# Patient Record
Sex: FEMALE | Race: WHITE | NOT HISPANIC OR LATINO | Employment: UNEMPLOYED | URBAN - METROPOLITAN AREA
[De-identification: names, ages, dates, MRNs, and addresses within clinical notes are randomized per-mention and may not be internally consistent; named-entity substitution may affect disease eponyms.]

---

## 2024-09-05 ENCOUNTER — LAB (OUTPATIENT)
Dept: LAB | Facility: LAB | Age: 29
End: 2024-09-05
Payer: COMMERCIAL

## 2024-09-05 DIAGNOSIS — E56.9 VITAMIN DEFICIENCY: Primary | ICD-10-CM

## 2024-09-05 DIAGNOSIS — R41.89 BRAIN FOG: ICD-10-CM

## 2024-09-05 PROBLEM — M94.0 COSTOCHONDRITIS: Status: ACTIVE | Noted: 2024-09-05

## 2024-09-05 PROBLEM — K59.00 CONSTIPATION: Status: ACTIVE | Noted: 2024-09-05

## 2024-09-05 PROBLEM — F41.9 ANXIETY: Status: ACTIVE | Noted: 2024-09-05

## 2024-09-05 LAB
25(OH)D3 SERPL-MCNC: 22 NG/ML (ref 30–100)
ANION GAP SERPL CALC-SCNC: 9 MMOL/L (ref 10–20)
BUN SERPL-MCNC: 12 MG/DL (ref 6–23)
CALCIUM SERPL-MCNC: 8.5 MG/DL (ref 8.6–10.3)
CHLORIDE SERPL-SCNC: 104 MMOL/L (ref 98–107)
CO2 SERPL-SCNC: 26 MMOL/L (ref 21–32)
CREAT SERPL-MCNC: 0.72 MG/DL (ref 0.5–1.05)
EGFRCR SERPLBLD CKD-EPI 2021: >90 ML/MIN/1.73M*2
ERYTHROCYTE [DISTWIDTH] IN BLOOD BY AUTOMATED COUNT: 12.5 % (ref 11.5–14.5)
GLUCOSE SERPL-MCNC: 91 MG/DL (ref 74–99)
HCT VFR BLD AUTO: 42.8 % (ref 36–46)
HGB BLD-MCNC: 14.1 G/DL (ref 12–16)
IRON SATN MFR SERPL: 31 % (ref 25–45)
IRON SERPL-MCNC: 110 UG/DL (ref 35–150)
MCH RBC QN AUTO: 29.6 PG (ref 26–34)
MCHC RBC AUTO-ENTMCNC: 32.9 G/DL (ref 32–36)
MCV RBC AUTO: 90 FL (ref 80–100)
NRBC BLD-RTO: 0 /100 WBCS (ref 0–0)
PLATELET # BLD AUTO: 220 X10*3/UL (ref 150–450)
POTASSIUM SERPL-SCNC: 4.2 MMOL/L (ref 3.5–5.3)
RBC # BLD AUTO: 4.76 X10*6/UL (ref 4–5.2)
SODIUM SERPL-SCNC: 135 MMOL/L (ref 136–145)
TIBC SERPL-MCNC: 352 UG/DL (ref 240–445)
TSH SERPL-ACNC: 1.53 MIU/L (ref 0.44–3.98)
UIBC SERPL-MCNC: 242 UG/DL (ref 110–370)
VIT B12 SERPL-MCNC: 410 PG/ML (ref 211–911)
WBC # BLD AUTO: 6.5 X10*3/UL (ref 4.4–11.3)

## 2024-09-05 PROCEDURE — 80048 BASIC METABOLIC PNL TOTAL CA: CPT

## 2024-09-05 PROCEDURE — 82607 VITAMIN B-12: CPT

## 2024-09-05 PROCEDURE — 82306 VITAMIN D 25 HYDROXY: CPT

## 2024-09-05 PROCEDURE — 85027 COMPLETE CBC AUTOMATED: CPT

## 2024-09-05 PROCEDURE — 83540 ASSAY OF IRON: CPT

## 2024-09-05 PROCEDURE — 83550 IRON BINDING TEST: CPT

## 2024-09-05 PROCEDURE — 84443 ASSAY THYROID STIM HORMONE: CPT

## 2024-09-05 PROCEDURE — 36415 COLL VENOUS BLD VENIPUNCTURE: CPT

## 2024-09-06 PROBLEM — E56.9 VITAMIN DEFICIENCY: Status: ACTIVE | Noted: 2024-09-06

## 2024-09-06 RX ORDER — CHOLECALCIFEROL (VITAMIN D3) 50 MCG
2000 TABLET ORAL DAILY
Qty: 30 TABLET | Refills: 1 | Status: SHIPPED | OUTPATIENT
Start: 2024-09-06 | End: 2024-11-05

## 2024-09-09 ENCOUNTER — TELEPHONE (OUTPATIENT)
Dept: PRIMARY CARE | Facility: CLINIC | Age: 29
End: 2024-09-09
Payer: COMMERCIAL

## 2024-09-09 NOTE — TELEPHONE ENCOUNTER
Called and spoke to patient regarding results of labs, that vitamin D was low and that 2,000 U daily was recommended and to recheck in 8 weeks.   Verbalized understanding.

## 2024-09-09 NOTE — TELEPHONE ENCOUNTER
----- Message from Lucius Sutherland sent at 9/6/2024  7:24 AM EDT -----  Low vitamin D, would recommend vitamin D 2000 units daily and re-check in 8 weeks

## 2024-12-26 ENCOUNTER — APPOINTMENT (OUTPATIENT)
Dept: PRIMARY CARE | Facility: CLINIC | Age: 29
End: 2024-12-26
Payer: COMMERCIAL

## 2024-12-26 ENCOUNTER — LAB (OUTPATIENT)
Dept: LAB | Facility: LAB | Age: 29
End: 2024-12-26
Payer: COMMERCIAL

## 2024-12-26 VITALS
HEART RATE: 92 BPM | WEIGHT: 138.4 LBS | SYSTOLIC BLOOD PRESSURE: 93 MMHG | DIASTOLIC BLOOD PRESSURE: 59 MMHG | BODY MASS INDEX: 26.58 KG/M2 | TEMPERATURE: 97.2 F | OXYGEN SATURATION: 94 %

## 2024-12-26 DIAGNOSIS — R59.1 LAD (LYMPHADENOPATHY): Primary | ICD-10-CM

## 2024-12-26 DIAGNOSIS — R59.1 LAD (LYMPHADENOPATHY): ICD-10-CM

## 2024-12-26 LAB
BASOPHILS # BLD AUTO: 0.04 X10*3/UL (ref 0–0.1)
BASOPHILS NFR BLD AUTO: 0.6 %
EOSINOPHIL # BLD AUTO: 0.09 X10*3/UL (ref 0–0.7)
EOSINOPHIL NFR BLD AUTO: 1.4 %
ERYTHROCYTE [DISTWIDTH] IN BLOOD BY AUTOMATED COUNT: 12.4 % (ref 11.5–14.5)
HCT VFR BLD AUTO: 43.3 % (ref 36–46)
HGB BLD-MCNC: 14 G/DL (ref 12–16)
IMM GRANULOCYTES # BLD AUTO: 0.03 X10*3/UL (ref 0–0.7)
IMM GRANULOCYTES NFR BLD AUTO: 0.5 % (ref 0–0.9)
LYMPHOCYTES # BLD AUTO: 1.79 X10*3/UL (ref 1.2–4.8)
LYMPHOCYTES NFR BLD AUTO: 27.1 %
MCH RBC QN AUTO: 29.1 PG (ref 26–34)
MCHC RBC AUTO-ENTMCNC: 32.3 G/DL (ref 32–36)
MCV RBC AUTO: 90 FL (ref 80–100)
MONOCYTES # BLD AUTO: 0.53 X10*3/UL (ref 0.1–1)
MONOCYTES NFR BLD AUTO: 8 %
NEUTROPHILS # BLD AUTO: 4.12 X10*3/UL (ref 1.2–7.7)
NEUTROPHILS NFR BLD AUTO: 62.4 %
NRBC BLD-RTO: 0 /100 WBCS (ref 0–0)
PLATELET # BLD AUTO: 264 X10*3/UL (ref 150–450)
RBC # BLD AUTO: 4.81 X10*6/UL (ref 4–5.2)
WBC # BLD AUTO: 6.6 X10*3/UL (ref 4.4–11.3)

## 2024-12-26 PROCEDURE — 99213 OFFICE O/P EST LOW 20 MIN: CPT | Performed by: STUDENT IN AN ORGANIZED HEALTH CARE EDUCATION/TRAINING PROGRAM

## 2024-12-26 PROCEDURE — 1036F TOBACCO NON-USER: CPT | Performed by: STUDENT IN AN ORGANIZED HEALTH CARE EDUCATION/TRAINING PROGRAM

## 2024-12-26 PROCEDURE — 85025 COMPLETE CBC W/AUTO DIFF WBC: CPT

## 2024-12-26 RX ORDER — CHOLECALCIFEROL (VITAMIN D3) 25 MCG
1000 TABLET ORAL DAILY
COMMUNITY

## 2024-12-26 ASSESSMENT — ENCOUNTER SYMPTOMS
LIGHT-HEADEDNESS: 0
CHILLS: 0
FEVER: 0
SHORTNESS OF BREATH: 1
PAIN: 1
HEADACHES: 0
DIZZINESS: 0
COUGH: 0
UNEXPECTED WEIGHT CHANGE: 1
WEAKNESS: 1
FATIGUE: 1

## 2024-12-26 NOTE — PROGRESS NOTES
Assessment/Plan   Problem List Items Addressed This Visit    None  Visit Diagnoses         Codes    LAD (lymphadenopathy)    -  Primary R59.1    Relevant Orders    CBC and Auto Differential    BI US breast complete right    BI mammo right diagnostic tomosynthesis            Subjective   Patient ID: Monica Lipscomb is a 29 y.o. female who presents for Pain (Rt side. Lymph node pain, starts around rib cage to under arm pit sometimes neck lymph is sore to touch, also ovaries feel full./Fog around eyes, overtired feeling after waking, weight gain even though incorp excersise.).  Pain  Associated symptoms include fatigue, shortness of breath (when going up the stairs) and weakness. Pertinent negatives include no chest pain, fever or headaches.     She states that four months ago she noticed pain in R axillary area. Last week she had cervical adenopathy on R side which has since resolved.. She has R She feels tired, has mind fog, pelvic pain and fullness at when she lays down at night.   Review of Systems   Constitutional:  Positive for fatigue and unexpected weight change. Negative for chills and fever.   HENT:  Negative for congestion.    Respiratory:  Positive for shortness of breath (when going up the stairs). Negative for cough.    Cardiovascular:  Negative for chest pain.   Neurological:  Positive for weakness. Negative for dizziness, light-headedness and headaches.       Objective   Physical Exam  Constitutional:       General: She is not in acute distress.     Appearance: Normal appearance. She is not ill-appearing.   HENT:      Head: Normocephalic and atraumatic.   Eyes:      Extraocular Movements: Extraocular movements intact.   Cardiovascular:      Rate and Rhythm: Normal rate and regular rhythm.   Pulmonary:      Effort: Pulmonary effort is normal. No respiratory distress.      Breath sounds: Normal breath sounds. No stridor. No wheezing, rhonchi or rales.   Lymphadenopathy:      Cervical: No cervical  adenopathy.   Neurological:      Mental Status: She is alert.             Lucius Sutherland MD 12/26/24 10:32 AM

## 2024-12-31 ENCOUNTER — HOSPITAL ENCOUNTER (OUTPATIENT)
Dept: RADIOLOGY | Facility: CLINIC | Age: 29
Discharge: HOME | End: 2024-12-31
Payer: COMMERCIAL

## 2024-12-31 VITALS — BODY MASS INDEX: 26.14 KG/M2 | WEIGHT: 138.45 LBS | HEIGHT: 61 IN

## 2024-12-31 DIAGNOSIS — R59.1 LAD (LYMPHADENOPATHY): ICD-10-CM

## 2024-12-31 PROCEDURE — 76642 ULTRASOUND BREAST LIMITED: CPT | Mod: 50

## 2024-12-31 PROCEDURE — 77066 DX MAMMO INCL CAD BI: CPT

## 2024-12-31 PROCEDURE — 77062 BREAST TOMOSYNTHESIS BI: CPT | Performed by: STUDENT IN AN ORGANIZED HEALTH CARE EDUCATION/TRAINING PROGRAM

## 2024-12-31 PROCEDURE — 76983 USE EA ADDL TARGET LESION: CPT | Mod: 50

## 2024-12-31 PROCEDURE — 76642 ULTRASOUND BREAST LIMITED: CPT | Performed by: STUDENT IN AN ORGANIZED HEALTH CARE EDUCATION/TRAINING PROGRAM

## 2024-12-31 PROCEDURE — 77066 DX MAMMO INCL CAD BI: CPT | Performed by: STUDENT IN AN ORGANIZED HEALTH CARE EDUCATION/TRAINING PROGRAM

## 2025-07-29 ENCOUNTER — APPOINTMENT (OUTPATIENT)
Dept: PRIMARY CARE | Facility: CLINIC | Age: 30
End: 2025-07-29
Payer: COMMERCIAL

## 2025-07-29 DIAGNOSIS — H53.8 BLURRED VISION: ICD-10-CM

## 2025-07-29 DIAGNOSIS — R42 VERTIGO: ICD-10-CM

## 2025-07-29 DIAGNOSIS — F41.1 GAD (GENERALIZED ANXIETY DISORDER): Primary | ICD-10-CM

## 2025-07-29 DIAGNOSIS — R51.9 INTERMITTENT HEADACHE: ICD-10-CM

## 2025-07-29 DIAGNOSIS — R53.83 OTHER FATIGUE: ICD-10-CM

## 2025-07-29 PROCEDURE — 1036F TOBACCO NON-USER: CPT | Performed by: STUDENT IN AN ORGANIZED HEALTH CARE EDUCATION/TRAINING PROGRAM

## 2025-07-29 PROCEDURE — 99214 OFFICE O/P EST MOD 30 MIN: CPT | Performed by: STUDENT IN AN ORGANIZED HEALTH CARE EDUCATION/TRAINING PROGRAM

## 2025-07-29 RX ORDER — ESCITALOPRAM OXALATE 5 MG/1
5 TABLET ORAL DAILY
Qty: 30 TABLET | Refills: 2 | Status: SHIPPED | OUTPATIENT
Start: 2025-07-29 | End: 2025-10-27

## 2025-07-29 NOTE — PROGRESS NOTES
Assessment/Plan   Assessment & Plan  Blurred vision    Orders:    MR brain w and wo IV contrast; Future    Basic metabolic panel; Future    CBC; Future    TSH; Future    Tsh With Reflex To Free T4 If Abnormal; Future    Intermittent headache    Orders:    MR brain w and wo IV contrast; Future    Basic metabolic panel; Future    CBC; Future    TSH; Future    Tsh With Reflex To Free T4 If Abnormal; Future    Vertigo    Orders:    MR brain w and wo IV contrast; Future    Basic metabolic panel; Future    CBC; Future    Other fatigue    Orders:    Basic metabolic panel; Future    CBC; Future    TSH; Future    Tsh With Reflex To Free T4 If Abnormal; Future    Vitamin D 25-Hydroxy,Total (for eval of Vitamin D levels); Future    RUTHIE (generalized anxiety disorder)    Orders:    escitalopram (Lexapro) 5 mg tablet; Take 1 tablet (5 mg) by mouth once daily.    -start lexapro 5 mg every day  If no improvement 1 month will pursue MRI    Subjective   Patient ID: Monica Lipscomb is a 29 y.o. female who presents for No chief complaint on file..  HPI    Patient has increased physical activity which has been helpful with improving overall fatigue and mind fog. She works out 4-5 x per week.    She continues to experience a monthly phenomenon of vertigo, fatigue, headaches, blurred vision which seems to be worsening and now happening almost every day.     She has full 8 hours of sleep and feels well rested in the morning however later in day will feel fatigued and need to take a nap. She also feels more irritated, has been clenching her jaw more often.    She feels like she can hear her heartbeat in her ears as well.     Symptoms seemed to be worsening since she gave birth to her son April 2024.     She does overall feel that her anxiety has increased, would like to trial medication such as lexapro to help better manage symtpoms. Benefits an risks reviewed today    Objective   Physical Exam  Constitutional:       Appearance: Normal  appearance.   HENT:      Head: Normocephalic and atraumatic.     Eyes:      Extraocular Movements: Extraocular movements intact.       Neurological:      Mental Status: She is alert.          (Delete if audio only)    Virtual or Telephone Consent    An interactive audio and video telecommunication system which permits real time communications between the patient (at the originating site) and provider (at the distant site) was utilized to provide this telehealth service.   Verbal consent was requested and obtained from Monica Lipscomb on this date, 07/29/25 for a telehealth visit and the patient's location was confirmed at the time of the visit.       Lucius Sutherland MD 07/29/25 11:47 AM

## 2025-08-20 DIAGNOSIS — F41.1 GAD (GENERALIZED ANXIETY DISORDER): ICD-10-CM

## 2025-08-20 RX ORDER — ESCITALOPRAM OXALATE 5 MG/1
5 TABLET ORAL DAILY
Qty: 90 TABLET | Refills: 1 | Status: SHIPPED | OUTPATIENT
Start: 2025-08-20